# Patient Record
Sex: MALE | Race: BLACK OR AFRICAN AMERICAN | NOT HISPANIC OR LATINO | URBAN - METROPOLITAN AREA
[De-identification: names, ages, dates, MRNs, and addresses within clinical notes are randomized per-mention and may not be internally consistent; named-entity substitution may affect disease eponyms.]

---

## 2023-08-23 ENCOUNTER — OFFICE VISIT (OUTPATIENT)
Dept: OPHTHALMOLOGY | Facility: CLINIC | Age: 58
End: 2023-08-23
Payer: MEDICAID

## 2023-08-23 ENCOUNTER — CLINICAL SUPPORT (OUTPATIENT)
Dept: OPHTHALMOLOGY | Facility: CLINIC | Age: 58
End: 2023-08-23
Payer: MEDICAID

## 2023-08-23 DIAGNOSIS — H40.1133 PRIMARY OPEN ANGLE GLAUCOMA (POAG) OF BOTH EYES, SEVERE STAGE: ICD-10-CM

## 2023-08-23 PROCEDURE — 92020 GONIOSCOPY: CPT | Mod: PBBFAC | Performed by: STUDENT IN AN ORGANIZED HEALTH CARE EDUCATION/TRAINING PROGRAM

## 2023-08-23 PROCEDURE — 1159F MED LIST DOCD IN RCRD: CPT | Mod: CPTII,,, | Performed by: STUDENT IN AN ORGANIZED HEALTH CARE EDUCATION/TRAINING PROGRAM

## 2023-08-23 PROCEDURE — 99999 PR PBB SHADOW E&M-EST. PATIENT-LVL II: CPT | Mod: PBBFAC,,, | Performed by: STUDENT IN AN ORGANIZED HEALTH CARE EDUCATION/TRAINING PROGRAM

## 2023-08-23 PROCEDURE — 92083 HUMPHREY VISUAL FIELD - OU - BOTH EYES: ICD-10-PCS | Mod: 26,S$PBB,, | Performed by: STUDENT IN AN ORGANIZED HEALTH CARE EDUCATION/TRAINING PROGRAM

## 2023-08-23 PROCEDURE — 1159F PR MEDICATION LIST DOCUMENTED IN MEDICAL RECORD: ICD-10-PCS | Mod: CPTII,,, | Performed by: STUDENT IN AN ORGANIZED HEALTH CARE EDUCATION/TRAINING PROGRAM

## 2023-08-23 PROCEDURE — 99212 OFFICE O/P EST SF 10 MIN: CPT | Mod: PBBFAC | Performed by: STUDENT IN AN ORGANIZED HEALTH CARE EDUCATION/TRAINING PROGRAM

## 2023-08-23 PROCEDURE — 99999 PR PBB SHADOW E&M-EST. PATIENT-LVL II: ICD-10-PCS | Mod: PBBFAC,,, | Performed by: STUDENT IN AN ORGANIZED HEALTH CARE EDUCATION/TRAINING PROGRAM

## 2023-08-23 PROCEDURE — 92133 CPTRZD OPH DX IMG PST SGM ON: CPT | Mod: PBBFAC | Performed by: STUDENT IN AN ORGANIZED HEALTH CARE EDUCATION/TRAINING PROGRAM

## 2023-08-23 PROCEDURE — 92133 POSTERIOR SEGMENT OCT OPTIC NERVE(OCULAR COHERENCE TOMOGRAPHY) - OU - BOTH EYES: ICD-10-PCS | Mod: 26,S$PBB,, | Performed by: STUDENT IN AN ORGANIZED HEALTH CARE EDUCATION/TRAINING PROGRAM

## 2023-08-23 PROCEDURE — 92020 PR SPECIAL EYE EVAL,GONIOSCOPY: ICD-10-PCS | Mod: S$PBB,,, | Performed by: STUDENT IN AN ORGANIZED HEALTH CARE EDUCATION/TRAINING PROGRAM

## 2023-08-23 PROCEDURE — 99204 OFFICE O/P NEW MOD 45 MIN: CPT | Mod: S$PBB,,, | Performed by: STUDENT IN AN ORGANIZED HEALTH CARE EDUCATION/TRAINING PROGRAM

## 2023-08-23 PROCEDURE — 76514 ULTRASOUND PACHYMETRY: ICD-10-PCS | Mod: 26,S$PBB,, | Performed by: STUDENT IN AN ORGANIZED HEALTH CARE EDUCATION/TRAINING PROGRAM

## 2023-08-23 PROCEDURE — 99204 PR OFFICE/OUTPT VISIT, NEW, LEVL IV, 45-59 MIN: ICD-10-PCS | Mod: S$PBB,,, | Performed by: STUDENT IN AN ORGANIZED HEALTH CARE EDUCATION/TRAINING PROGRAM

## 2023-08-23 PROCEDURE — 92083 EXTENDED VISUAL FIELD XM: CPT | Mod: PBBFAC | Performed by: STUDENT IN AN ORGANIZED HEALTH CARE EDUCATION/TRAINING PROGRAM

## 2023-08-23 PROCEDURE — 76514 ECHO EXAM OF EYE THICKNESS: CPT | Mod: PBBFAC | Performed by: STUDENT IN AN ORGANIZED HEALTH CARE EDUCATION/TRAINING PROGRAM

## 2023-08-23 PROCEDURE — 92020 GONIOSCOPY: CPT | Mod: S$PBB,,, | Performed by: STUDENT IN AN ORGANIZED HEALTH CARE EDUCATION/TRAINING PROGRAM

## 2023-08-23 RX ORDER — DORZOLAMIDE HYDROCHLORIDE AND TIMOLOL MALEATE 20; 5 MG/ML; MG/ML
1 SOLUTION/ DROPS OPHTHALMIC 2 TIMES DAILY
Qty: 10 ML | Refills: 11 | Status: SHIPPED | OUTPATIENT
Start: 2023-08-23 | End: 2023-12-05 | Stop reason: SDUPTHER

## 2023-08-23 RX ORDER — LATANOPROST 50 UG/ML
1 SOLUTION/ DROPS OPHTHALMIC DAILY
Qty: 2.5 ML | Refills: 11 | Status: SHIPPED | OUTPATIENT
Start: 2023-08-23 | End: 2023-12-05 | Stop reason: SDUPTHER

## 2023-08-23 NOTE — PROGRESS NOTES
Subjective:  HPI     Glaucoma     Additional comments: New patient glaucoma evaluation per Dr. Alisha Ramirez ([facility]).           Comments    Chief complaint: New patient glaucoma evaluation per Dr. Alisha Ramirez   ([facility]). Pt states that he had a routine eye exam   in June 2023 and was informed that he has elevate IOP OU. Pt states that   he was not given any eye medication to help treat. Pt denies any eye pain   or headache.    Past medical history? HTN  Past ocular history? (+)elevated IOP OU    Glaucoma history:  Diagnosed with glaucoma when? 2 months ago  Hx eye surgery? None  Hx eye lasers? None  History of low blood pressure? Yes  History of migraines? Yes  History of blunt trauma to eye? (+)pt hit in eye a few years ago while   playing basketball. Pt does not remember which eye.  History of steroid use? None  Family history of glaucoma? None per pt history. Pt's sister has hx of   cataracts.  What is the highest your eye pressure has been? Unknown    Eye drops? None            Last edited by Adelina Ngo on 8/23/2023 10:11 AM.        Exam:  Base Eye Exam       Visual Acuity (Snellen - Linear)         Right Left    Dist sc 20/20 20/60 +1    Dist ph sc  20/50 +1              Tonometry (Applanation, 09:45 AM)         Right Left    Pressure 32 34              Pachymetry (8/23/2023)         Right Left    Thickness 558 556              Gonioscopy (Wojciech)         Right Left    Temporal SS SS    Nasal SS SS    Superior SS SS    Inferior SS SS              Pupils         Dark Light Shape React APD    Right 4 3 Round Brisk None    Left 4 3 Round Slow +2              Visual Fields         Right Left    Restrictions Partial outer superior temporal, inferior temporal, superior nasal, inferior nasal deficiencies Partial outer superior temporal, inferior temporal, superior nasal, inferior nasal deficiencies              Neuro/Psych       Oriented x3: Yes    Mood/Affect:  Normal                  Slit Lamp and Fundus Exam       External Exam         Right Left    External Normal Normal              Slit Lamp Exam         Right Left    Lids/Lashes Normal Normal    Conjunctiva/Sclera Pigmentation, Injection Pigmentation, Injection    Cornea Arcus Arcus    Anterior Chamber Deep and quiet Deep and quiet    Iris Round and reactive Round and reactive    Lens 1+ Nuclear sclerosis 1+ Nuclear sclerosis              Fundus Exam         Right Left    Disc Cupping, thin sup/inf Cupping, thin sup/inf    C/D Ratio 0.9 0.95                  Refraction       Manifest Refraction (Auto)         Sphere Cylinder Axis    Right [place] +0.25 079    Left +0.50 +1.75 001                  Assessment:  Presumed juvenile open angle glaucoma, severe stage, OD<OS  - Synopsis: Referred by Dr. Alisha Ramirez for elevated IOP OU.   - H/o blunt eye trauma: no black eye but hit with hand during basketball game  - Surg hx: none   - Laser hx: none  - Glaucoma FHx: denies  -  / 556  - Gonio:  (Coulon 8/2023)  - Tmax: 32/34  - Target IOP: <12 OU (severity)  - Med adverse effects: n/a  - RAPD OS on presentation 8/2023    Baseline HVF 8/2023  Baseline RNFL 8/2023 -- avg 43/50  Baseline photos pending    08/23/2023  IOP 32/34 above target off drops  HVF: reliable, SAD/IAD, central island, VFI 20 OD  reliable, SAD/IAD, central island, VFI 9 OS -- baseline  OCT RNFL: diffuse thinning, avg 43 OD  diffuse thinning, avg 50 OS -- baseline, at floor OU  +RAPD OS    Cataracts OU  - Mild, NVS, monitor    Plan:  Severe JOAG OU. Risk of irreversible blindness from glaucoma discussed. Optimize IOP.  - Start Cosopt 2/2, Xalatan 1/1    Return 2 weeks -- VA, IOP, Dilate, Optos/Stereo photos on way out    Griselda Silvestre MD  [facility], Glaucoma

## 2023-08-23 NOTE — PATIENT INSTRUCTIONS
Today we discussed the following diagnosis:  Juvenile open angle glaucoma, severe stage, right<left eye    INSTRUCTIONS  Please use your eyedrops as follows:  - The color refers to the bottle top.  - Some generic medications come in bottle with white tops. If you have any questions about your drops, please ask your doctor.    Drug Eye Top Color Breakfast Lunch Dinner Bedtime   Cosopt:  Dorzolamide/  Timolol BOTH Blue X  X    Xalatan:  Latanoprost BOTH Teal    X     - Always wait at least 5 minutes between drops to allow them to work.  - It is very important to take your eye drops every day as instructed, including the day of your next visit.  - Please remember to bring your eye drops to your next visit.  - If you run out of any of your drops before your next visit, please have the pharmacy call your doctor to authorize a refill.    Griselda Silvestre MD  Office number: [phone]

## 2023-09-06 ENCOUNTER — OFFICE VISIT (OUTPATIENT)
Dept: OPHTHALMOLOGY | Facility: CLINIC | Age: 58
End: 2023-09-06
Payer: MEDICAID

## 2023-09-06 DIAGNOSIS — H40.1133 PRIMARY OPEN ANGLE GLAUCOMA (POAG) OF BOTH EYES, SEVERE STAGE: Primary | ICD-10-CM

## 2023-09-06 PROCEDURE — 99214 OFFICE O/P EST MOD 30 MIN: CPT | Mod: S$PBB,,, | Performed by: STUDENT IN AN ORGANIZED HEALTH CARE EDUCATION/TRAINING PROGRAM

## 2023-09-06 PROCEDURE — 99999 PR PBB SHADOW E&M-EST. PATIENT-LVL II: ICD-10-PCS | Mod: PBBFAC,,, | Performed by: STUDENT IN AN ORGANIZED HEALTH CARE EDUCATION/TRAINING PROGRAM

## 2023-09-06 PROCEDURE — 92250 COLOR FUNDUS PHOTOGRAPHY - OU - BOTH EYES: ICD-10-PCS | Mod: 26,S$PBB,, | Performed by: STUDENT IN AN ORGANIZED HEALTH CARE EDUCATION/TRAINING PROGRAM

## 2023-09-06 PROCEDURE — 92015 DETERMINE REFRACTIVE STATE: CPT | Mod: ,,, | Performed by: STUDENT IN AN ORGANIZED HEALTH CARE EDUCATION/TRAINING PROGRAM

## 2023-09-06 PROCEDURE — 99999 PR PBB SHADOW E&M-EST. PATIENT-LVL II: CPT | Mod: PBBFAC,,, | Performed by: STUDENT IN AN ORGANIZED HEALTH CARE EDUCATION/TRAINING PROGRAM

## 2023-09-06 PROCEDURE — 99214 PR OFFICE/OUTPT VISIT, EST, LEVL IV, 30-39 MIN: ICD-10-PCS | Mod: S$PBB,,, | Performed by: STUDENT IN AN ORGANIZED HEALTH CARE EDUCATION/TRAINING PROGRAM

## 2023-09-06 PROCEDURE — 92015 PR REFRACTION: ICD-10-PCS | Mod: ,,, | Performed by: STUDENT IN AN ORGANIZED HEALTH CARE EDUCATION/TRAINING PROGRAM

## 2023-09-06 PROCEDURE — 1159F MED LIST DOCD IN RCRD: CPT | Mod: CPTII,,, | Performed by: STUDENT IN AN ORGANIZED HEALTH CARE EDUCATION/TRAINING PROGRAM

## 2023-09-06 PROCEDURE — 1159F PR MEDICATION LIST DOCUMENTED IN MEDICAL RECORD: ICD-10-PCS | Mod: CPTII,,, | Performed by: STUDENT IN AN ORGANIZED HEALTH CARE EDUCATION/TRAINING PROGRAM

## 2023-09-06 PROCEDURE — 92250 FUNDUS PHOTOGRAPHY W/I&R: CPT | Mod: PBBFAC | Performed by: STUDENT IN AN ORGANIZED HEALTH CARE EDUCATION/TRAINING PROGRAM

## 2023-09-06 PROCEDURE — 99212 OFFICE O/P EST SF 10 MIN: CPT | Mod: PBBFAC | Performed by: STUDENT IN AN ORGANIZED HEALTH CARE EDUCATION/TRAINING PROGRAM

## 2023-09-06 NOTE — PROGRESS NOTES
Subjective:  HPI     Glaucoma     Additional comments: 2 week IOP check + DFE with Optos/ONH photos.  (+)RAPD OS           Comments    Pt here for 2 week IOP check + DFE with Optos/ONH photos. Pt states that   he had some cloudy VA recently during rainy weather while driving. Pt   states that it takes a while to focus in between dark and bright lighting.   Pt denies any eye pain. Pt states that he has been using all drops as   instructed.    DLS: 08/23/2023    Gtts:  1. Cosopt BID OU  2. Latanoprost qhs OU    POHx:  1. Presumed juvenile open angle glaucoma, severe stage, OD<OS      - Target IOP: <12 OU (severity)      - RAPD OS on presentation 8/2023  2. Cataracts OU          Last edited by Adelina Ngo on 9/6/2023  8:11 AM.        Exam:  Base Eye Exam       Visual Acuity (Snellen - Linear)         Right Left    Dist cc 20/50 +2 20/40    Dist ph cc 20/20 -3 20/40 +2      Correction: Glasses              Tonometry (Applanation, 8:25 AM)         Right Left    Pressure 17 16              Pupils         Dark Light Shape React APD    Right 4 3 Round Brisk None    Left 4 3 Round Brisk APD              Neuro/Psych       Oriented x3: Yes    Mood/Affect: Normal              Dilation       Both eyes: 1% Mydriacyl, 2.5% Phenylephrine @ 8:25 AM                  Slit Lamp and Fundus Exam       External Exam         Right Left    External Normal Normal              Slit Lamp Exam         Right Left    Lids/Lashes Normal Normal    Conjunctiva/Sclera Pigmentation, Injection Pigmentation, Injection    Cornea Arcus Arcus    Anterior Chamber Deep and quiet Deep and quiet    Iris Round and reactive Round and reactive    Lens 1+ Nuclear sclerosis, 1+ Cortical cataract 1+ Nuclear sclerosis, 1+ Cortical cataract    Anterior Vitreous Normal Normal              Fundus Exam         Right Left    Disc Cupping, thin sup/inf Cupping, thin sup/inf    C/D Ratio 0.9 0.95    Macula Normal Normal    Vessels Normal Normal    Periphery Superior CRS,  otherwise flat and normal 260 Normal                  Refraction       Wearing Rx         Sphere Cylinder Add    Right +0.50 Sphere +2.00    Left +0.50 Sphere +2.00      Type: Lined Bifocal              Manifest Refraction         Sphere Cylinder Dist [place]    Right [place] Sphere 20/20    Left -0.50 Sphere 20/40              Final Rx         Sphere Cylinder Axis Add    Right [place] +0.25 079 +2.00    Left +0.50 +1.75 001 +2.00      Type: Lined Bifocal    Expiration Date: 09/06/2024                  Assessment:  Presumed juvenile open angle glaucoma, severe stage, OD<OS  - Synopsis: Referred by Dr. Alisha Ramirez for elevated IOP OU.   - H/o blunt eye trauma: no black eye but hit with hand during basketball game  - Surg hx: none   - Laser hx: none  - Glaucoma FHx: denies  -  / 556  - Gonio:  (Konrad 8/2023)  - Tmax: 32/34  - Target IOP: <12 OU (severity)  - Med adverse effects: n/a  - RAPD OS on presentation 8/2023  - Med hx: Cosopt/Xalatan 8/2023    Baseline HVF 8/2023  Baseline RNFL 8/2023 -- avg 43/50  Baseline photos 9/2023    Last imaging:  HVF: reliable, SAD/IAD, central island, VFI 20 OD  reliable, SAD/IAD, central island, VFI 9 OS -- baseline  OCT RNFL: diffuse thinning, avg 43 OD  diffuse thinning, avg 50 OS -- baseline, at floor OU  +RAPD OS    09/06/2023  IOP 17/16 on Cosopt 2/2, Xalatan 1/1  Nice improvement in IOP with start of drops    Cataracts OU  - Mild, NVS, monitor  - MRX provided today    Plan:  Severe JOAG OU. Risk of irreversible blindness from glaucoma discussed. Optimize IOP.    Good IOP response with addition of drops. Daily compliance stressed.  - Continue Cosopt 2/2, Xalatan 1/1  - MRx dispensed    Return 3 months -- HVF 10-2 OU, OCT, VA, IOP  Next DFE due 9/2024    Griselda Silvestre MD  [facility], Glaucoma

## 2023-12-05 ENCOUNTER — CLINICAL SUPPORT (OUTPATIENT)
Dept: OPHTHALMOLOGY | Facility: CLINIC | Age: 58
End: 2023-12-05
Payer: MEDICAID

## 2023-12-05 ENCOUNTER — OFFICE VISIT (OUTPATIENT)
Dept: OPHTHALMOLOGY | Facility: CLINIC | Age: 58
End: 2023-12-05
Payer: MEDICAID

## 2023-12-05 DIAGNOSIS — I10 PRIMARY HYPERTENSION: ICD-10-CM

## 2023-12-05 DIAGNOSIS — H40.1133 PRIMARY OPEN ANGLE GLAUCOMA (POAG) OF BOTH EYES, SEVERE STAGE: Primary | ICD-10-CM

## 2023-12-05 DIAGNOSIS — H25.813 COMBINED FORMS OF AGE-RELATED CATARACT OF BOTH EYES: ICD-10-CM

## 2023-12-05 PROCEDURE — 99999 PR PBB SHADOW E&M-EST. PATIENT-LVL I: CPT | Mod: PBBFAC,,, | Performed by: STUDENT IN AN ORGANIZED HEALTH CARE EDUCATION/TRAINING PROGRAM

## 2023-12-05 PROCEDURE — 99999 PR PBB SHADOW E&M-EST. PATIENT-LVL I: ICD-10-PCS | Mod: PBBFAC,,, | Performed by: STUDENT IN AN ORGANIZED HEALTH CARE EDUCATION/TRAINING PROGRAM

## 2023-12-05 PROCEDURE — 99214 PR OFFICE/OUTPT VISIT, EST, LEVL IV, 30-39 MIN: ICD-10-PCS | Mod: S$PBB,,, | Performed by: STUDENT IN AN ORGANIZED HEALTH CARE EDUCATION/TRAINING PROGRAM

## 2023-12-05 PROCEDURE — 99214 OFFICE O/P EST MOD 30 MIN: CPT | Mod: S$PBB,,, | Performed by: STUDENT IN AN ORGANIZED HEALTH CARE EDUCATION/TRAINING PROGRAM

## 2023-12-05 PROCEDURE — 1159F MED LIST DOCD IN RCRD: CPT | Mod: CPTII,,, | Performed by: STUDENT IN AN ORGANIZED HEALTH CARE EDUCATION/TRAINING PROGRAM

## 2023-12-05 PROCEDURE — 99211 OFF/OP EST MAY X REQ PHY/QHP: CPT | Mod: PBBFAC | Performed by: STUDENT IN AN ORGANIZED HEALTH CARE EDUCATION/TRAINING PROGRAM

## 2023-12-05 PROCEDURE — 92133 CPTRZD OPH DX IMG PST SGM ON: CPT | Mod: PBBFAC | Performed by: STUDENT IN AN ORGANIZED HEALTH CARE EDUCATION/TRAINING PROGRAM

## 2023-12-05 PROCEDURE — 92083 EXTENDED VISUAL FIELD XM: CPT | Mod: PBBFAC | Performed by: STUDENT IN AN ORGANIZED HEALTH CARE EDUCATION/TRAINING PROGRAM

## 2023-12-05 PROCEDURE — 92083 HUMPHREY VISUAL FIELD - OU - BOTH EYES: ICD-10-PCS | Mod: 26,S$PBB,, | Performed by: STUDENT IN AN ORGANIZED HEALTH CARE EDUCATION/TRAINING PROGRAM

## 2023-12-05 PROCEDURE — 92133 POSTERIOR SEGMENT OCT OPTIC NERVE(OCULAR COHERENCE TOMOGRAPHY) - OU - BOTH EYES: ICD-10-PCS | Mod: 26,S$PBB,, | Performed by: STUDENT IN AN ORGANIZED HEALTH CARE EDUCATION/TRAINING PROGRAM

## 2023-12-05 PROCEDURE — 1159F PR MEDICATION LIST DOCUMENTED IN MEDICAL RECORD: ICD-10-PCS | Mod: CPTII,,, | Performed by: STUDENT IN AN ORGANIZED HEALTH CARE EDUCATION/TRAINING PROGRAM

## 2023-12-05 RX ORDER — LATANOPROST 50 UG/ML
1 SOLUTION/ DROPS OPHTHALMIC DAILY
Qty: 2.5 ML | Refills: 11 | Status: SHIPPED | OUTPATIENT
Start: 2023-12-05 | End: 2024-12-04

## 2023-12-05 RX ORDER — DORZOLAMIDE HYDROCHLORIDE AND TIMOLOL MALEATE 20; 5 MG/ML; MG/ML
1 SOLUTION/ DROPS OPHTHALMIC 2 TIMES DAILY
Qty: 10 ML | Refills: 11 | Status: SHIPPED | OUTPATIENT
Start: 2023-12-05 | End: 2024-12-04

## 2023-12-05 NOTE — PROGRESS NOTES
Assessment:  Presumed juvenile open angle glaucoma, severe stage, OD<OS  - Synopsis: Referred by Dr. Alisha Ramirez for elevated IOP OU.   - H/o blunt eye trauma: no black eye but hit with hand during basketball game  - Surg hx: none   - Laser hx: none  - Glaucoma FHx: denies  -  / 556  - Gonio:  (Coulon 8/2023)  - Tmax: 32/34  - Target IOP: <12 OU (severity)  - Med adverse effects: n/a  - RAPD OS on presentation 8/2023  - Med hx: Cosopt/Xalatan 8/2023    Baseline HVF 24-2 8/2023 -- VFI 20/9  Baseline HVF 10-2 12/2023 -- MD -15.62/-23.55  Baseline RNFL 8/2023 -- avg 43/50  Baseline photos 9/2023    Last imaging:  HVF: reliable, SAD/IAD, central island, VFI 20 OD  reliable, SAD/IAD, central island, VFI 9 OS -- baseline  OCT RNFL: diffuse thinning, avg 43 OD  diffuse thinning, avg 50 OS -- baseline, at floor OU    12/05/2023  IOP 12/12 on Cosopt 2/2, Xalatan 1/1  HVF 10-2: reliable, SAD, MD -15.62, PSD 12.59 OD  reliable, SAD>IAD, MD -23.55, PSD 10.79 -- baseline  OCT: diffuse thinning, avg 45 OD  diffuse thinning, avg 50 OS -- likely stable c/w prior but at floor OU    Cataracts OU  - Mild, NVS, monitor    Plan:  IOP at goal. Baseline HVF 10-2 today, severe OD<OS.   - Continue Cosopt 2/2, Xalatan 1/1    Return 4 months -- HVF 10-2 OU, VA, IOP  Next DFE due 9/2024    Griselda Silvestre MD  [facility], Glaucoma

## 2023-12-05 NOTE — PROGRESS NOTES
HVF rel/fix coop good OU/chart checked for latex allergy/0 + 0.75 x 079 OD .50 + 1.75 x 001 OS - BJ

## 2024-03-11 ENCOUNTER — OFFICE VISIT (OUTPATIENT)
Dept: PRIMARY CARE CLINIC | Facility: CLINIC | Age: 59
End: 2024-03-11
Payer: MEDICAID

## 2024-03-11 VITALS
DIASTOLIC BLOOD PRESSURE: 89 MMHG | WEIGHT: 169.88 LBS | OXYGEN SATURATION: 99 % | SYSTOLIC BLOOD PRESSURE: 139 MMHG | HEART RATE: 65 BPM

## 2024-03-11 DIAGNOSIS — Z12.5 PROSTATE CANCER SCREENING: ICD-10-CM

## 2024-03-11 DIAGNOSIS — F11.20 HEROIN ADDICTION: ICD-10-CM

## 2024-03-11 DIAGNOSIS — B35.3 TINEA PEDIS OF BOTH FEET: ICD-10-CM

## 2024-03-11 DIAGNOSIS — I10 PRIMARY HYPERTENSION: ICD-10-CM

## 2024-03-11 DIAGNOSIS — Z12.11 COLON CANCER SCREENING: Primary | ICD-10-CM

## 2024-03-11 DIAGNOSIS — R22.9 LUMP OF SKIN: ICD-10-CM

## 2024-03-11 DIAGNOSIS — Z13.1 DIABETES MELLITUS SCREENING: ICD-10-CM

## 2024-03-11 DIAGNOSIS — F19.90 SUBSTANCE USE DISORDER: ICD-10-CM

## 2024-03-11 DIAGNOSIS — Z86.79 HISTORY OF HYPERTENSION: ICD-10-CM

## 2024-03-11 DIAGNOSIS — Z11.59 ENCOUNTER FOR HEPATITIS C SCREENING TEST FOR LOW RISK PATIENT: ICD-10-CM

## 2024-03-11 DIAGNOSIS — Z11.4 SCREENING FOR HIV (HUMAN IMMUNODEFICIENCY VIRUS): ICD-10-CM

## 2024-03-11 PROCEDURE — 3075F SYST BP GE 130 - 139MM HG: CPT | Mod: CPTII,,, | Performed by: STUDENT IN AN ORGANIZED HEALTH CARE EDUCATION/TRAINING PROGRAM

## 2024-03-11 PROCEDURE — 99999 PR PBB SHADOW E&M-EST. PATIENT-LVL IV: CPT | Mod: PBBFAC,,, | Performed by: STUDENT IN AN ORGANIZED HEALTH CARE EDUCATION/TRAINING PROGRAM

## 2024-03-11 PROCEDURE — 99204 OFFICE O/P NEW MOD 45 MIN: CPT | Mod: S$PBB,,, | Performed by: STUDENT IN AN ORGANIZED HEALTH CARE EDUCATION/TRAINING PROGRAM

## 2024-03-11 PROCEDURE — 3079F DIAST BP 80-89 MM HG: CPT | Mod: CPTII,,, | Performed by: STUDENT IN AN ORGANIZED HEALTH CARE EDUCATION/TRAINING PROGRAM

## 2024-03-11 PROCEDURE — 4010F ACE/ARB THERAPY RXD/TAKEN: CPT | Mod: CPTII,,, | Performed by: STUDENT IN AN ORGANIZED HEALTH CARE EDUCATION/TRAINING PROGRAM

## 2024-03-11 PROCEDURE — 99214 OFFICE O/P EST MOD 30 MIN: CPT | Mod: PBBFAC,PN | Performed by: STUDENT IN AN ORGANIZED HEALTH CARE EDUCATION/TRAINING PROGRAM

## 2024-03-11 RX ORDER — LOSARTAN POTASSIUM 50 MG/1
1 TABLET ORAL DAILY
COMMUNITY
Start: 2023-05-30 | End: 2024-05-29

## 2024-03-11 NOTE — PROGRESS NOTES
03/12/2024    Gatito Dao  [id number]    Chief Complaint   Patient presents with    [facility]    Hypertension       HPI    This patient is new to me and presents to est care. PMH sig for HTN and glaucoma. Hx of incarceration, patient has been out for 11 months and needs a pcp. Describes diet as regular [place]. Exercise: none. Sleep: no concerns     Hx of HTN  Has not taken since getting out  Hx of losartan    Glaucoma: follows with ophthalmology    Athlete's foot: states that it's really bad and would like referral     Lump on right side: present for a long time and would like it worked up    Substance use: pt endorses heroin use intermittently for the last 30 years. Most recent use this morning. Patient snorts.       Negative 10 point ROS outside of HPI    Social History     Socioeconomic History    Marital status: Other         Current Outpatient Medications:     losartan (COZAAR) 50 MG tablet, Take 1 tablet by mouth once daily., Disp: , Rfl:     dorzolamide-timolol 2-0.5% (COSOPT) 22.3-6.8 mg/mL ophthalmic solution, Place 1 drop into both eyes 2 (two) times daily., Disp: 10 mL, Rfl: 11    latanoprost (XALATAN) 0.005 % ophthalmic solution, Place 1 drop into both eyes once daily., Disp: 2.5 mL, Rfl: 11      Physical Exam  Vitals:    03/11/24 1411   BP: 139/89   Pulse: 65       Physical Exam  Abdominal:          Comments: Highlighted area w/soft non mobile mass, nontender        Gen: well appearing, NAD  Resp: non labored breathing, no crackles, no wheezes, CTAB  CV: RRR no murmur, gallops, rubs, no LE edema  Abd: soft nontender BS present no organomegaly      2. Colon cancer screening  - Ambulatory referral/consult to Endo Procedure ; Future    3. Prostate cancer screening  Last PSA wnl  - PSA, SCREENING; Future    4. Screening for HIV (human immunodeficiency virus)  - HIV 1/2 Ag/Ab (4th Gen); Future    5. Encounter for hepatitis C screening test for low risk patient  - HEPATITIS C ANTIBODY;  Future    6. History of hypertension  Recommended daily checking for the next 2 weeks   - Lipid Panel; Future  - CBC Auto Differential; Future  - Comprehensive Metabolic Panel; Future    7. Diabetes mellitus screening  - Hemoglobin A1C; Future    8. Tinea pedis of both feet  Recommended keeping feet dry  - Ambulatory referral/consult to Podiatry; Future    9. Lump of skin  Feels like a lipoma  - US Abdomen Limited; Future    10. Substance use disorder  Heroin; last use this AM  - Ambulatory referral/consult to Psychology; Future      RTC in 6 months for routine care    Cleopatra Faria MD  Family Medicine

## 2024-03-12 ENCOUNTER — PATIENT OUTREACH (OUTPATIENT)
Dept: ADMINISTRATIVE | Facility: OTHER | Age: 59
End: 2024-03-12
Payer: MEDICAID

## 2024-03-12 PROBLEM — I10 PRIMARY HYPERTENSION: Status: ACTIVE | Noted: 2024-03-12

## 2024-03-12 PROBLEM — F11.20 HEROIN ADDICTION: Status: ACTIVE | Noted: 2024-03-12

## 2024-03-12 NOTE — PROGRESS NOTES
CHW - Initial Contact    This Community Health Worker completed the Social Determinant of Health questionnaire with MRN [id number] over the phone today.    Pt identified barriers of most importance are: No barriers reported   Referrals to community agencies completed with patient/caregiver consent outside of [facility] include: No   Referrals were put through [facility] - No   Support and Services: No support & services have been documented.  Other information discussed the patient needs / wants help with: SDOH completed. No assistance requested at this time.   Follow up required: No   No future outreach task assigned

## 2024-03-13 ENCOUNTER — TELEPHONE (OUTPATIENT)
Dept: GASTROENTEROLOGY | Facility: CLINIC | Age: 59
End: 2024-03-13
Payer: MEDICAID

## 2024-03-14 ENCOUNTER — HOSPITAL ENCOUNTER (OUTPATIENT)
Dept: RADIOLOGY | Facility: OTHER | Age: 59
Discharge: HOME | End: 2024-03-14
Attending: STUDENT IN AN ORGANIZED HEALTH CARE EDUCATION/TRAINING PROGRAM
Payer: MEDICAID

## 2024-03-14 DIAGNOSIS — R22.9 LUMP OF SKIN: ICD-10-CM

## 2024-03-14 PROCEDURE — 76705 ECHO EXAM OF ABDOMEN: CPT | Mod: TC

## 2024-03-14 PROCEDURE — 76705 ECHO EXAM OF ABDOMEN: CPT | Mod: 26,,, | Performed by: RADIOLOGY

## 2024-03-15 ENCOUNTER — TELEPHONE (OUTPATIENT)
Dept: PRIMARY CARE CLINIC | Facility: CLINIC | Age: 59
End: 2024-03-15
Payer: MEDICAID

## 2024-03-15 NOTE — TELEPHONE ENCOUNTER
----- Message from Cleopatra Faria MD sent at 3/15/2024  8:17 AM CDT -----  Please let patient know that his blood work was okay there were a couple of values that were just mildly outside of normal range and so I would like to repeat his labs in 3- 6 months.

## 2024-03-15 NOTE — TELEPHONE ENCOUNTER
----- Message from Cleopatra Faria MD sent at 3/15/2024  8:16 AM CDT -----  Please let patient know that his area of concern is likely a lipoma, which is a benign fatty tumor.  If he would like to removed I can place a referral to General surgery.  Otherwise it will cause no harm hard.

## 2024-04-02 ENCOUNTER — OFFICE VISIT (OUTPATIENT)
Dept: OPHTHALMOLOGY | Facility: CLINIC | Age: 59
End: 2024-04-02
Payer: MEDICAID

## 2024-04-02 ENCOUNTER — CLINICAL SUPPORT (OUTPATIENT)
Dept: OPHTHALMOLOGY | Facility: CLINIC | Age: 59
End: 2024-04-02
Payer: MEDICAID

## 2024-04-02 DIAGNOSIS — H40.1133 PRIMARY OPEN ANGLE GLAUCOMA (POAG) OF BOTH EYES, SEVERE STAGE: Primary | ICD-10-CM

## 2024-04-02 DIAGNOSIS — H25.813 COMBINED FORMS OF AGE-RELATED CATARACT OF BOTH EYES: ICD-10-CM

## 2024-04-02 PROCEDURE — 99999 PR PBB SHADOW E&M-EST. PATIENT-LVL I: CPT | Mod: PBBFAC,,, | Performed by: STUDENT IN AN ORGANIZED HEALTH CARE EDUCATION/TRAINING PROGRAM

## 2024-04-02 PROCEDURE — 99211 OFF/OP EST MAY X REQ PHY/QHP: CPT | Mod: PBBFAC | Performed by: STUDENT IN AN ORGANIZED HEALTH CARE EDUCATION/TRAINING PROGRAM

## 2024-04-02 PROCEDURE — 92083 EXTENDED VISUAL FIELD XM: CPT | Mod: PBBFAC | Performed by: STUDENT IN AN ORGANIZED HEALTH CARE EDUCATION/TRAINING PROGRAM

## 2024-04-02 PROCEDURE — G2211 COMPLEX E/M VISIT ADD ON: HCPCS | Mod: S$PBB,,, | Performed by: STUDENT IN AN ORGANIZED HEALTH CARE EDUCATION/TRAINING PROGRAM

## 2024-04-02 PROCEDURE — 99214 OFFICE O/P EST MOD 30 MIN: CPT | Mod: S$PBB,,, | Performed by: STUDENT IN AN ORGANIZED HEALTH CARE EDUCATION/TRAINING PROGRAM

## 2024-04-02 PROCEDURE — 3044F HG A1C LEVEL LT 7.0%: CPT | Mod: CPTII,,, | Performed by: STUDENT IN AN ORGANIZED HEALTH CARE EDUCATION/TRAINING PROGRAM

## 2024-04-02 PROCEDURE — 4010F ACE/ARB THERAPY RXD/TAKEN: CPT | Mod: CPTII,,, | Performed by: STUDENT IN AN ORGANIZED HEALTH CARE EDUCATION/TRAINING PROGRAM

## 2024-04-02 RX ORDER — BRIMONIDINE TARTRATE 2 MG/ML
1 SOLUTION/ DROPS OPHTHALMIC 2 TIMES DAILY
Qty: 15 ML | Refills: 11 | Status: SHIPPED | OUTPATIENT
Start: 2024-04-02 | End: 2025-04-02

## 2024-04-02 RX ORDER — LATANOPROST 50 UG/ML
1 SOLUTION/ DROPS OPHTHALMIC DAILY
Qty: 2.5 ML | Refills: 11 | Status: SHIPPED | OUTPATIENT
Start: 2024-04-02 | End: 2025-04-02

## 2024-04-02 NOTE — PROGRESS NOTES
HVF rel/fix coop good OU/chart checked for latex allergy/0 + 0.25 x 079 OD 0.50 + 1.75 x 001 OS - BJ

## 2024-04-02 NOTE — PATIENT INSTRUCTIONS
INSTRUCTIONS  Please use your eyedrops as follows:  - The color refers to the bottle top.  - Some generic medications come in bottle with white tops. If you have any questions about your drops, please ask your doctor.    Drug Eye Top Color Breakfast Lunch Dinner Bedtime   Alphagan:  Brimonidine BOTH Purple X  X    Cosopt:  Dorzolamide/  Timolol BOTH Blue X  X    Xalatan:  Latanoprost BOTH Teal    X     - Always wait at least 5 minutes between drops to allow them to work.  - It is very important to take your eye drops every day as instructed, including the day of your next visit.  - Please remember to bring your eye drops to your next visit.  - If you run out of any of your drops before your next visit, please have the pharmacy call your doctor to authorize a refill.    Griselda Silvestre MD  Office number: [phone]    ----    Selective Laser Trabeculoplasty  Selective Laser Trabeculoplasty (SLT) is a laser treatment that is used to lower the pressure of the eye.  Laser energy stimulates the internal drainage tissue of the eye (called the trabecular meshwork) and lowering of the eye pressure. The full effects of the laser treatment may not be apparent until 1-2 months after the laser treatment.    Indications  Patients with open-angle glaucoma who are in need of eye pressure lowering.  This can be in addition to eye drops or sometimes in place of eye drops.    Benefits  When used as initial therapy, SLT can lower the eye pressure by about 30%.  The effect may be reduced in patients who are already on medical treatment with eye drops.  The laser is effective in lowering eye pressure in 75-80% of eyes and the effect of the laser can last up to 1-5 years. Some studies have shown effects lasting up to 3 years in 50% of patients. The procedure is non-invasive with minimal discomfort and is performed in an outpatient clinic.  Treatment of each eye is approximately 5-10 minutes and there are no restrictions of  activities afterward.  The laser treatment can be repeated. Laser treatment does not affect the success rates of other medical or surgical treatments.    Risks  In about 10% of procedures, there is a temporary elevation of eye pressure immediately following the laser treatment that is managed with glaucoma medications and generally goes away within 24 hours.  Some patients also report a mild temporary eye discomfort from the lens used during the procedure that can be treated with over-the-counter artificial tears.    What to expect on the day of SLT  Check into clinic as you typically would for a routine clinic appointment.  A technician will then check your vision and your eye pressure and review any medications and eye drops you might be taking.  They will then review a consent form for the SLT procedure with you.      The doctor will then review your exam findings and confirm that an SLT procedure is still the appropriate treatment.  The doctor will put a drop of anesthetic to the eye(s) that are to have the SLT.  The doctor will then position you in the laser machine and place a special contact lens on the surface of the eye.  The lens is coated with a special jelly to help focus the light properly into the eye.  You will then hear a series of clicks and may see flashes of red light as the laser treats the drainage tissue of the eye.  There is virtually no pain with the procedure.  You will feel the lens slowly rotating on the surface of the eye as the doctor treats various areas within the eye.  The procedure generally takes 5-10 minutes per eye.  At the conclusion of the laser treatment, the lens will be removed from the eye.  Because of the jelly used on the lens, it is normal for the vision to be temporarily blurred.      After the laser treatment, you will be asked to wait in the waiting area and your eye pressure will be checked after 20 minutes. If the pressure has not gone up, you will be released home  after scheduling an appropriate follow up.       There are typically no restrictions in activities following laser treatment.  You may return to your normal daily routine.  While side effects of SLT are rare, should you experience any decrease in vision, worsening pain or worsening redness in the eye following your laser treatment, please contact your eye doctor for further instruction or come to the Emergency Room.

## 2024-04-03 ENCOUNTER — PATIENT MESSAGE (OUTPATIENT)
Dept: ADMINISTRATIVE | Facility: HOSPITAL | Age: 59
End: 2024-04-03
Payer: MEDICAID

## 2024-05-17 ENCOUNTER — TELEPHONE (OUTPATIENT)
Dept: PODIATRY | Facility: CLINIC | Age: 59
End: 2024-05-17
Payer: MEDICAID

## 2024-05-23 ENCOUNTER — CLINICAL SUPPORT (OUTPATIENT)
Dept: ENDOSCOPY | Facility: HOSPITAL | Age: 59
End: 2024-05-23
Attending: STUDENT IN AN ORGANIZED HEALTH CARE EDUCATION/TRAINING PROGRAM
Payer: MEDICAID

## 2024-05-23 ENCOUNTER — OFFICE VISIT (OUTPATIENT)
Dept: OPHTHALMOLOGY | Facility: CLINIC | Age: 59
End: 2024-05-23
Payer: MEDICAID

## 2024-05-23 DIAGNOSIS — H40.1133 PRIMARY OPEN ANGLE GLAUCOMA (POAG) OF BOTH EYES, SEVERE STAGE: Primary | ICD-10-CM

## 2024-05-23 DIAGNOSIS — H25.813 COMBINED FORMS OF AGE-RELATED CATARACT OF BOTH EYES: ICD-10-CM

## 2024-05-23 DIAGNOSIS — Z12.11 COLON CANCER SCREENING: ICD-10-CM

## 2024-05-23 PROCEDURE — 99214 OFFICE O/P EST MOD 30 MIN: CPT | Mod: S$PBB,,, | Performed by: STUDENT IN AN ORGANIZED HEALTH CARE EDUCATION/TRAINING PROGRAM

## 2024-05-23 PROCEDURE — 4010F ACE/ARB THERAPY RXD/TAKEN: CPT | Mod: CPTII,,, | Performed by: STUDENT IN AN ORGANIZED HEALTH CARE EDUCATION/TRAINING PROGRAM

## 2024-05-23 PROCEDURE — 99211 OFF/OP EST MAY X REQ PHY/QHP: CPT | Mod: PBBFAC | Performed by: STUDENT IN AN ORGANIZED HEALTH CARE EDUCATION/TRAINING PROGRAM

## 2024-05-23 PROCEDURE — G2211 COMPLEX E/M VISIT ADD ON: HCPCS | Mod: S$PBB,,, | Performed by: STUDENT IN AN ORGANIZED HEALTH CARE EDUCATION/TRAINING PROGRAM

## 2024-05-23 PROCEDURE — 3044F HG A1C LEVEL LT 7.0%: CPT | Mod: CPTII,,, | Performed by: STUDENT IN AN ORGANIZED HEALTH CARE EDUCATION/TRAINING PROGRAM

## 2024-05-23 PROCEDURE — 1159F MED LIST DOCD IN RCRD: CPT | Mod: CPTII,,, | Performed by: STUDENT IN AN ORGANIZED HEALTH CARE EDUCATION/TRAINING PROGRAM

## 2024-05-23 PROCEDURE — 99999 PR PBB SHADOW E&M-EST. PATIENT-LVL I: CPT | Mod: PBBFAC,,, | Performed by: STUDENT IN AN ORGANIZED HEALTH CARE EDUCATION/TRAINING PROGRAM

## 2024-05-23 NOTE — PROGRESS NOTES
Assessment:  Juvenile open angle glaucoma, severe stage, OD<OS  - Synopsis: Referred by Dr. Alisha Ramirez for elevated IOP OU.   - H/o blunt eye trauma: no black eye but hit with hand during basketball game  - Surg hx: none   - Laser hx: none  - Glaucoma FHx: denies  -  / 556  - Gonio:  (Konrad 8/2023)  - Tmax: 32/34  - Target IOP: <12 OU (severity)  - Med adverse effects: n/a  - RAPD OS on presentation 8/2023  - Med hx: Cosopt/Xalatan 8/2023    Baseline HVF 24-2 8/2023 -- VFI 20/9  Baseline HVF 10-2 12/2023 -- MD -15.62/-23.55  Baseline RNFL 8/2023 -- avg 43/50  Baseline photos 9/2023    Last imaging:  HVF 24-2: reliable, SAD/IAD, central island, VFI 20 OD  reliable, SAD/IAD, central island, VFI 9 OS -- baseline  HVF 10-2: reliable, SAD, MD -12.27, PSD 12.35 OD  reliable, SAD>IAD, MD -20.83, PSD 12.45 -- stable c/w prior  OCT: diffuse thinning, avg 45 OD  diffuse thinning, avg 50 OS -- likely stable c/w prior but at floor OU    05/23/2024  IOP 14/14 on regimen below  Much improved c/w prior    Cataracts OU  - Mild, NVS, monitor    Plan:  IOP likely adequate for now. CPM. Hesitant for SLT in the past.  - Continue Cosopt 2/2, Xalatan 1/1, Brim 2/2    Today's visit is associated with current and anticipated ongoing medical care related to this patient's single serious/complex condition (glaucoma). Follow up is to be continued indefinitely to monitor the condition.    Return 3 months -- HVF 10-2 OU, VA, IOP, Dilate  Next DFE due 9/2024    Griselda Silvestre MD  [facility], Glaucoma

## 2024-05-24 ENCOUNTER — TELEPHONE (OUTPATIENT)
Dept: PODIATRY | Facility: CLINIC | Age: 59
End: 2024-05-24
Payer: MEDICAID

## 2024-05-24 NOTE — TELEPHONE ENCOUNTER
Rescheduled patient from 6/6/24 to 8/7/24 at 10:00 am due to provider on vacation. patient was also added to the wait list. Verbalized understanding and no further issues discussed.

## 2024-07-09 ENCOUNTER — PATIENT MESSAGE (OUTPATIENT)
Dept: ADMINISTRATIVE | Facility: HOSPITAL | Age: 59
End: 2024-07-09
Payer: MEDICAID

## 2024-08-01 ENCOUNTER — TELEPHONE (OUTPATIENT)
Dept: PRIMARY CARE CLINIC | Facility: CLINIC | Age: 59
End: 2024-08-01
Payer: MEDICAID

## 2024-08-01 DIAGNOSIS — Z12.11 COLON CANCER SCREENING: Primary | ICD-10-CM

## 2024-08-01 NOTE — TELEPHONE ENCOUNTER
----- Message from Mony Short sent at 8/1/2024 11:02 AM CDT -----  Contact: shasha @ [phone]  Gatito Dao calling regarding Orders (message) for #pt is calling to get orders put in for conoloscopy, asking for call back

## 2024-08-07 ENCOUNTER — OFFICE VISIT (OUTPATIENT)
Dept: PODIATRY | Facility: CLINIC | Age: 59
End: 2024-08-07
Payer: MEDICAID

## 2024-08-07 VITALS — DIASTOLIC BLOOD PRESSURE: 85 MMHG | HEART RATE: 50 BPM | WEIGHT: 161.19 LBS | SYSTOLIC BLOOD PRESSURE: 131 MMHG

## 2024-08-07 DIAGNOSIS — B35.3 TINEA PEDIS OF BOTH FEET: ICD-10-CM

## 2024-08-07 DIAGNOSIS — F19.90 SUBSTANCE USE DISORDER: ICD-10-CM

## 2024-08-07 DIAGNOSIS — B35.1 ONYCHOMYCOSIS OF TOENAIL: ICD-10-CM

## 2024-08-07 DIAGNOSIS — L60.8 DISCOLORATION AND THICKENING OF NAILS BOTH FEET: Primary | ICD-10-CM

## 2024-08-07 PROCEDURE — 99999 PR PBB SHADOW E&M-EST. PATIENT-LVL III: CPT | Mod: PBBFAC,,, | Performed by: PODIATRIST

## 2024-08-07 PROCEDURE — 99213 OFFICE O/P EST LOW 20 MIN: CPT | Mod: PBBFAC,PN | Performed by: PODIATRIST

## 2024-08-07 PROCEDURE — 3079F DIAST BP 80-89 MM HG: CPT | Mod: CPTII,,, | Performed by: PODIATRIST

## 2024-08-07 PROCEDURE — 3075F SYST BP GE 130 - 139MM HG: CPT | Mod: CPTII,,, | Performed by: PODIATRIST

## 2024-08-07 PROCEDURE — 99203 OFFICE O/P NEW LOW 30 MIN: CPT | Mod: S$PBB,,, | Performed by: PODIATRIST

## 2024-08-07 PROCEDURE — 3044F HG A1C LEVEL LT 7.0%: CPT | Mod: CPTII,,, | Performed by: PODIATRIST

## 2024-08-07 NOTE — PROGRESS NOTES
Subjective:      Patient ID: Gatito Dao is a 59 y.o. male.    Chief Complaint: Tinea Pedis and Nail Problem    Gatito is a 59 y.o. male who presents new to the clinic accompanied by sister, who 'takes care of him', here to ask questions. He c/o of thick & discolored toenails on both feet & white between toes. States tried everything including alcohol, toothpaste, vinegar water, powder & foot cream. No pain or itching. Noticed changes for 10 yrs. Gatito is inquiring about tx options. 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    PCP Cleopatra Faria MD 3/11/24    Past Medical History:   Diagnosis Date    Cataract     Glaucoma     Hypertension      Patient Active Problem List   Diagnosis    Primary open angle glaucoma (POAG) of both eyes, severe stage    Combined forms of age-related cataract of both eyes    Heroin addiction    Primary hypertension      Objective:      Review of Systems   Constitutional: Negative for malaise/fatigue.   Skin:  Positive for color change, dry skin, itching, nail changes and rash.   Musculoskeletal:  Negative for falls, joint pain and myalgias.   Neurological:  Negative for focal weakness, sensory change and weakness.   Psychiatric/Behavioral:  Positive for substance abuse. The patient is not nervous/anxious.      Physical Exam  Vitals reviewed.   Constitutional:       General: He is not in acute distress.     Appearance: He is well-developed.   Cardiovascular:      Pulses:           Dorsalis pedis pulses are 1+ on the right side and 1+ on the left side.   Musculoskeletal:         General: No swelling or tenderness.   Feet:      Right foot:      Skin integrity: Dry skin present. No fissure.      Toenail Condition: Fungal disease present.     Left foot:      Skin integrity: Dry skin present. No fissure.      Toenail Condition: Fungal disease present.  Skin:     Capillary Refill: Capillary refill takes 2 to 3 seconds.      Findings: Rash present. No bruising, erythema or lesion.       Comments: R>L 2nd - 4th IMS maceration & peeling.    Onychomycosis total B/L hallux, medial 4th & distal 1/2 5th. Remaining nails w/out mycotic involvement.   Neurological:      Mental Status: He is alert and oriented to person, place, and time.      Sensory: Sensation is intact. No sensory deficit.      Motor: Motor function is intact. No weakness or abnormal muscle tone.      Gait: Gait is intact. Gait normal.   Psychiatric:         Mood and Affect: Mood and affect normal.         Behavior: Behavior normal. Behavior is cooperative.         Assessment:      Encounter Diagnoses   Name Primary?    Tinea pedis of both feet     Discoloration and thickening of nails both feet Yes    Onychomycosis of toenail     Substance use disorder        Problem List Items Addressed This Visit    None  Visit Diagnoses       Discoloration and thickening of nails both feet    -  Primary    Tinea pedis of both feet        Onychomycosis of toenail        Substance use disorder              Plan:       Gatito was seen today for tinea pedis and nail problem.    Diagnoses and all orders for this visit:    Discoloration and thickening of nails both feet    Tinea pedis of both feet  -     Ambulatory referral/consult to Podiatry    Onychomycosis of toenail    Substance use disorder    I counseled the patient on his conditions, their implications & medical mgmt.    - Shoe inspection. Patient instructed on proper foot hygeine. We discussed wearing proper shoe gear, daily foot inspections, never walking w/out protective shoe gear.    Instructions provided on OTC topical antifungal tx options for nails & skin.    F/u prn.        A total of 29 mins.was spent on chart review, patient visit & documentation.

## 2024-08-08 ENCOUNTER — TELEPHONE (OUTPATIENT)
Dept: ENDOSCOPY | Facility: HOSPITAL | Age: 59
End: 2024-08-08
Payer: MEDICAID

## 2024-08-08 VITALS — WEIGHT: 161 LBS

## 2024-08-08 DIAGNOSIS — Z12.11 ENCOUNTER FOR SCREENING COLONOSCOPY: Primary | ICD-10-CM

## 2024-08-08 DIAGNOSIS — Z12.11 COLON CANCER SCREENING: Primary | ICD-10-CM

## 2024-08-22 ENCOUNTER — CLINICAL SUPPORT (OUTPATIENT)
Dept: OPHTHALMOLOGY | Facility: CLINIC | Age: 59
End: 2024-08-22
Payer: MEDICAID

## 2024-08-22 ENCOUNTER — OFFICE VISIT (OUTPATIENT)
Dept: OPHTHALMOLOGY | Facility: CLINIC | Age: 59
End: 2024-08-22
Payer: MEDICAID

## 2024-08-22 DIAGNOSIS — H40.1133 PRIMARY OPEN ANGLE GLAUCOMA (POAG) OF BOTH EYES, SEVERE STAGE: Primary | ICD-10-CM

## 2024-08-22 DIAGNOSIS — H25.813 COMBINED FORMS OF AGE-RELATED CATARACT OF BOTH EYES: ICD-10-CM

## 2024-08-22 PROCEDURE — 99999 PR PBB SHADOW E&M-EST. PATIENT-LVL II: CPT | Mod: PBBFAC,,, | Performed by: STUDENT IN AN ORGANIZED HEALTH CARE EDUCATION/TRAINING PROGRAM

## 2024-08-22 PROCEDURE — 92083 EXTENDED VISUAL FIELD XM: CPT | Mod: PBBFAC | Performed by: STUDENT IN AN ORGANIZED HEALTH CARE EDUCATION/TRAINING PROGRAM

## 2024-08-22 PROCEDURE — 3044F HG A1C LEVEL LT 7.0%: CPT | Mod: CPTII,,, | Performed by: STUDENT IN AN ORGANIZED HEALTH CARE EDUCATION/TRAINING PROGRAM

## 2024-08-22 PROCEDURE — 99212 OFFICE O/P EST SF 10 MIN: CPT | Mod: PBBFAC | Performed by: STUDENT IN AN ORGANIZED HEALTH CARE EDUCATION/TRAINING PROGRAM

## 2024-08-22 PROCEDURE — G2211 COMPLEX E/M VISIT ADD ON: HCPCS | Mod: S$PBB,,, | Performed by: STUDENT IN AN ORGANIZED HEALTH CARE EDUCATION/TRAINING PROGRAM

## 2024-08-22 PROCEDURE — 99214 OFFICE O/P EST MOD 30 MIN: CPT | Mod: S$PBB,,, | Performed by: STUDENT IN AN ORGANIZED HEALTH CARE EDUCATION/TRAINING PROGRAM

## 2024-08-22 PROCEDURE — 1159F MED LIST DOCD IN RCRD: CPT | Mod: CPTII,,, | Performed by: STUDENT IN AN ORGANIZED HEALTH CARE EDUCATION/TRAINING PROGRAM

## 2024-08-22 NOTE — PROGRESS NOTES
Assessment:  Juvenile open angle glaucoma, severe stage, OD<OS  - Synopsis: Referred by Dr. Alisha Ramirez for elevated IOP OU.   - H/o blunt eye trauma: no black eye but hit with hand during basketball game  - Surg hx: none   - Laser hx: none  - Glaucoma FHx: denies  -  / 556  - Gonio:  (Coulon 8/2023)  - Tmax: 32/34  - Target IOP: <12 OU (severity)  - Med adverse effects: n/a  - RAPD OS on presentation 8/2023  - Med hx: Cosopt/Xalatan 8/2023    Baseline HVF 24-2 8/2023 -- VFI 20/9  Baseline HVF 10-2 12/2023 -- MD -15.62/-23.55  Baseline RNFL 8/2023 -- avg 43/50  Baseline photos 9/2023    Last imaging:  HVF 24-2: reliable, SAD/IAD, central island, VFI 20 OD  reliable, SAD/IAD, central island, VFI 9 OS -- baseline  HVF 10-2: reliable, SAD, MD -12.27, PSD 12.35 OD  reliable, SAD>IAD, MD -20.83, PSD 12.45 -- stable c/w prior  OCT: diffuse thinning, avg 45 OD  diffuse thinning, avg 50 OS -- likely stable c/w prior but at floor OU    08/22/2024  IOP 13/15 pre dilation, 16/18 post dilation  HVF 10-2: reliable, SAD, MD -10.04, PSD 9.51 OD  reliable, SAD>IAD, MD -19.66, PSD 12.24 -- stable c/w prior    Cataracts OU  - Mild, NVS, monitor    Plan:  IOP likely adequate for now. CPM. Hesitant for SLT in the past.   - Continue Cosopt 2/2, Xalatan 1/1, Brim 2/2    Today's visit is associated with current and anticipated ongoing medical care related to this patient's single serious/complex condition (glaucoma). Follow up is to be continued indefinitely to monitor the condition.    Return 4 months -- HVF 10-2 OU, VA, IOP  Next DFE due 8/2025    Griselda Silvestre MD  [facility], Glaucoma

## 2024-10-21 ENCOUNTER — ANESTHESIA EVENT (OUTPATIENT)
Dept: ENDOSCOPY | Facility: HOSPITAL | Age: 59
End: 2024-10-21
Payer: MEDICAID

## 2024-10-23 ENCOUNTER — TELEPHONE (OUTPATIENT)
Dept: ENDOSCOPY | Facility: HOSPITAL | Age: 59
End: 2024-10-23
Payer: MEDICAID

## 2024-10-23 NOTE — TELEPHONE ENCOUNTER
Spoke to patient for pre-call to confirm scheduled Colonoscopy and patient verbalized understanding of the following:       Date & arrival time of procedure(s) verified 10/30/24, 10:00 AM.  Location of procedure(s) [facility] 2nd Floor verified.  NPO status reinforced. Ok to continue clear liquids until 9:00 AM.   Patient denies use of blood thinners, GLP-1 medications, and weight loss medications.  Patient confirmed receipt of prep instructions and Rx prep.  Instructions provided to patient via [facility].  Patient confirmed ride home after procedure if procedure requires anesthesia.   Pre-call screening questionnaire reviewed and completed with patient.   Appointment details are tentative, including check-in time.  If the patient begins taking any blood thinning medications, injectable weight loss/diabetes medications (other than insulin), or Adipex (phentermine) patient was instructed to contact the endoscopy scheduling department as soon as possible.  Patient was advised to call the endoscopy scheduling department if any questions or concerns arise.     [facility] Scheduling Department

## 2024-10-30 ENCOUNTER — ANESTHESIA (OUTPATIENT)
Dept: ENDOSCOPY | Facility: HOSPITAL | Age: 59
End: 2024-10-30
Payer: MEDICAID

## 2024-10-30 ENCOUNTER — HOSPITAL ENCOUNTER (OUTPATIENT)
Facility: HOSPITAL | Age: 59
Discharge: HOME | End: 2024-10-30
Attending: INTERNAL MEDICINE | Admitting: INTERNAL MEDICINE
Payer: MEDICAID

## 2024-10-30 VITALS
RESPIRATION RATE: 18 BRPM | TEMPERATURE: 97 F | BODY MASS INDEX: 24.5 KG/M2 | WEIGHT: 175 LBS | DIASTOLIC BLOOD PRESSURE: 85 MMHG | OXYGEN SATURATION: 100 % | SYSTOLIC BLOOD PRESSURE: 123 MMHG | HEIGHT: 71 IN | HEART RATE: 50 BPM

## 2024-10-30 DIAGNOSIS — Z12.11 COLON CANCER SCREENING: Primary | ICD-10-CM

## 2024-10-30 DIAGNOSIS — Z12.11 SCREEN FOR COLON CANCER: ICD-10-CM

## 2024-10-30 PROCEDURE — 37000009 HC ANESTHESIA EA ADD 15 MINS: Performed by: INTERNAL MEDICINE

## 2024-10-30 PROCEDURE — 63600175 PHARM REV CODE 636 W HCPCS: Performed by: NURSE ANESTHETIST, CERTIFIED REGISTERED

## 2024-10-30 PROCEDURE — 88305 TISSUE EXAM BY PATHOLOGIST: CPT | Performed by: PATHOLOGY

## 2024-10-30 PROCEDURE — 45385 COLONOSCOPY W/LESION REMOVAL: CPT | Performed by: INTERNAL MEDICINE

## 2024-10-30 PROCEDURE — 94761 N-INVAS EAR/PLS OXIMETRY MLT: CPT

## 2024-10-30 PROCEDURE — 37000008 HC ANESTHESIA 1ST 15 MINUTES: Performed by: INTERNAL MEDICINE

## 2024-10-30 PROCEDURE — 27201089 HC SNARE, DISP (ANY): Performed by: INTERNAL MEDICINE

## 2024-10-30 PROCEDURE — 99900035 HC TECH TIME PER 15 MIN (STAT)

## 2024-10-30 PROCEDURE — 25000003 PHARM REV CODE 250: Performed by: NURSE ANESTHETIST, CERTIFIED REGISTERED

## 2024-10-30 PROCEDURE — A4216 STERILE WATER/SALINE, 10 ML: HCPCS | Performed by: NURSE ANESTHETIST, CERTIFIED REGISTERED

## 2024-10-30 PROCEDURE — 45385 COLONOSCOPY W/LESION REMOVAL: CPT | Mod: ,,, | Performed by: INTERNAL MEDICINE

## 2024-10-30 PROCEDURE — 88305 TISSUE EXAM BY PATHOLOGIST: CPT | Mod: 26,,, | Performed by: PATHOLOGY

## 2024-10-30 RX ORDER — PROPOFOL 10 MG/ML
VIAL (ML) INTRAVENOUS
Status: DISCONTINUED | OUTPATIENT
Start: 2024-10-30 | End: 2024-10-30

## 2024-10-30 RX ORDER — PROPOFOL 10 MG/ML
VIAL (ML) INTRAVENOUS CONTINUOUS PRN
Status: DISCONTINUED | OUTPATIENT
Start: 2024-10-30 | End: 2024-10-30

## 2024-10-30 RX ORDER — SODIUM CHLORIDE 9 MG/ML
INJECTION, SOLUTION INTRAVENOUS CONTINUOUS
Status: DISCONTINUED | OUTPATIENT
Start: 2024-10-30 | End: 2024-10-30 | Stop reason: HOSPADM

## 2024-10-30 RX ORDER — SODIUM CHLORIDE 0.9 % (FLUSH) 0.9 %
SYRINGE (ML) INJECTION
Status: DISCONTINUED | OUTPATIENT
Start: 2024-10-30 | End: 2024-10-30

## 2024-10-30 RX ORDER — LIDOCAINE HYDROCHLORIDE 20 MG/ML
INJECTION INTRAVENOUS
Status: DISCONTINUED | OUTPATIENT
Start: 2024-10-30 | End: 2024-10-30

## 2024-10-30 RX ADMIN — PROPOFOL 150 MCG/KG/MIN: 10 INJECTION, EMULSION INTRAVENOUS at 10:10

## 2024-10-30 RX ADMIN — Medication 20 ML: at 10:10

## 2024-10-30 RX ADMIN — Medication 10 ML: at 10:10

## 2024-10-30 RX ADMIN — Medication 30 ML: at 10:10

## 2024-10-30 RX ADMIN — LIDOCAINE HYDROCHLORIDE 8 MG: 20 INJECTION INTRAVENOUS at 10:10

## 2024-10-30 RX ADMIN — PROPOFOL 80 MG: 10 INJECTION, EMULSION INTRAVENOUS at 10:10

## 2024-10-30 NOTE — PLAN OF CARE
Pt arrived to recovery dosc via stretcher per ENDO team. Bedside report received. Pt attached to bedside monitor. VSS. Pt resting without distress post procedure. Pt on room air; oxygen sats 97%. Pt IV access clean, dry and intact, saline locked. Safety maintained.

## 2024-10-30 NOTE — PROVATION PATIENT INSTRUCTIONS
Discharge Summary/Instructions after an Endoscopic Procedure  Patient Name: Gatito Dao  Patient MRN: [id number]  Patient YOB: 1965 Wednesday, October 30, 2024  Benjamín Godinez MD  Dear patient,  As a result of recent federal legislation (The Federal Cures Act), you may   receive lab or pathology results from your procedure in your [facility]   account before your physician is able to contact you. Your physician or   their representative will relay the results to you with their   recommendations at their soonest availability.  Thank you,  RESTRICTIONS:  During your procedure today, you received medications for sedation.  These   medications may affect your judgment, balance and coordination.  Therefore,   for 24 hours, you have the following restrictions:   - DO NOT drive a car, operate machinery, make legal/financial decisions,   sign important papers or drink alcohol.    ACTIVITY:  Today: no heavy lifting, straining or running due to procedural   sedation/anesthesia.  The following day: return to full activity including work.  DIET:  Eat and drink normally unless instructed otherwise.     TREATMENT FOR COMMON SIDE EFFECTS:  - Mild abdominal pain, nausea, belching, bloating or excessive gas:  rest,   eat lightly and use a heating pad.  - Sore Throat: treat with throat lozenges and/or gargle with warm salt   water.  - Because air was used during the procedure, expelling large amounts of air   from your rectum or belching is normal.  - If a bowel prep was taken, you may not have a bowel movement for 1-3 days.    This is normal.  SYMPTOMS TO WATCH FOR AND REPORT TO YOUR PHYSICIAN:  1. Abdominal pain or bloating, other than gas cramps.  2. Chest pain.  3. Back pain.  4. Signs of infection such as: chills or fever occurring within 24 hours   after the procedure.  5. Rectal bleeding, which would show as bright red, maroon, or black stools.   (A tablespoon of blood from the rectum is not serious, especially  if   hemorrhoids are present.)  6. Vomiting.  7. Weakness or dizziness.  GO DIRECTLY TO THE NEAREST EMERGENCY ROOM IF YOU HAVE ANY OF THE FOLLOWING:      Difficulty breathing              Chills and/or fever over 101 F   Persistent vomiting and/or vomiting blood   Severe abdominal pain   Severe chest pain   Black, tarry stools   Bleeding- more than one tablespoon   Any other symptom or condition that you feel may need urgent attention  Your doctor recommends these additional instructions:  If any biopsies were taken, your doctors clinic will contact you in 1 to 2   weeks with any results.  - Patient has a contact number available for emergencies.  The signs and   symptoms of potential delayed complications were discussed with the   patient.  Return to normal activities tomorrow.  Written discharge   instructions were provided to the patient.   - Discharge patient to home.   - Resume previous diet.   - Continue present medications.   - Await pathology results.   - Repeat colonoscopy in 5 years for surveillance.   For questions, problems or results please call your physician - Benjamín Godinez MD at Work:  [phone].  [facility], EMERGENCY ROOM PHONE NUMBER: [phone]  IF A COMPLICATION OR EMERGENCY SITUATION ARISES AND YOU ARE UNABLE TO REACH   YOUR PHYSICIAN - GO DIRECTLY TO THE EMERGENCY ROOM.  Benjamín Godinez MD  10/30/2024 11:04:31 AM  This report has been verified and signed electronically.  Dear patient,  As a result of recent federal legislation (The Federal Cures Act), you may   receive lab or pathology results from your procedure in your [facility]   account before your physician is able to contact you. Your physician or   their representative will relay the results to you with their   recommendations at their soonest availability.  Thank you,  PROVATION

## 2024-10-30 NOTE — H&P
Short Stay Endoscopy History and Physical    PCP - Cleopatra Faria MD    Procedure - Colonoscopy  Sedation: GA  ASA - per anesthesia  Mallampati - per anesthesia  History of Anesthesia problems - no  Family history Anesthesia problems -  no     HPI:  This is a 59 y.o. male here for evaluation of : Screening for CRC    Reflux - no  Dysphagia - no  Abdominal pain - no  Diarrhea - no    ROS:  Constitutional: No fevers, chills, No weight loss  ENT: No allergies  CV: No chest pain  Pulm: No cough, No shortness of breath  Ophtho: No vision changes  GI: see HPI  Medical History:  has a past medical history of Cataract, Glaucoma, and Hypertension.    Surgical History:  has no past surgical history on file.    Family History: family history is not on file.. Otherwise no colon cancer, inflammatory bowel disease, or GI malignancies.    Social History:  reports that he has never smoked. He has never used smokeless tobacco.    Review of patient's allergies indicates:  No Known Allergies    Medications:   Medications Prior to Admission   Medication Sig Dispense Refill Last Dose/Taking    brimonidine 0.2% (ALPHAGAN) 0.2 % Drop Place 1 drop into both eyes 2 (two) times a day. 15 mL 11     dorzolamide-timolol 2-0.5% (COSOPT) 22.3-6.8 mg/mL ophthalmic solution Place 1 drop into both eyes 2 (two) times daily. 10 mL 11     latanoprost (XALATAN) 0.005 % ophthalmic solution Place 1 drop into both eyes once daily. 2.5 mL 11     latanoprost (XALATAN) 0.005 % ophthalmic solution Place 1 drop into both eyes once daily. 2.5 mL 11     losartan (COZAAR) 50 MG tablet Take 1 tablet by mouth once daily. (Patient not taking: Reported on 8/7/2024)          Objective Findings:    Vital Signs: Per nursing notes.    Physical Exam:  General Appearance: Well appearing in no acute distress  Head:   Normocephalic, without obvious abnormality  Eyes:    No scleral icterus  Airway: Open  Neck: No restriction in mobility  Lungs: CTA bilaterally in anterior and  posterior fields, no wheezes, no crackles.  Heart:  Regular rate and rhythm, S1, S2 normal, no murmurs heard  Abdomen: Soft, non tender, non distended      Labs:  Lab Results   Component Value Date    WBC 5.35 03/14/2024    HGB 13.5 (L) 03/14/2024    HCT 38.7 (L) 03/14/2024     03/14/2024    CHOL 195 03/14/2024    TRIG 50 03/14/2024    HDL 69 03/14/2024    ALT 25 03/14/2024    AST 24 03/14/2024     03/14/2024    K 5.2 (H) 03/14/2024     03/14/2024    CREATININE 1.1 03/14/2024    BUN 13 03/14/2024    CO2 23 03/14/2024    PSA 0.45 03/14/2024    HGBA1C 5.4 03/14/2024         I have explained the risks and benefits of endoscopy procedures to the patient including but not limited to bleeding, perforation, infection, and death.    Thank you so much for allowing me to participate in the care of Gatito Godinez MD

## 2024-11-01 LAB
FINAL PATHOLOGIC DIAGNOSIS: NORMAL
GROSS: NORMAL
Lab: NORMAL

## 2024-11-04 ENCOUNTER — TELEPHONE (OUTPATIENT)
Dept: GASTROENTEROLOGY | Facility: CLINIC | Age: 59
End: 2024-11-04
Payer: MEDICAID

## 2024-11-04 NOTE — TELEPHONE ENCOUNTER
----- Message from Benjamín Godinez MD sent at 11/4/2024  3:59 PM CST -----  Please call and notify patient, the colon polyp was benign.

## 2024-12-09 RX ORDER — LATANOPROST 50 UG/ML
1 SOLUTION/ DROPS OPHTHALMIC DAILY
Qty: 2.5 ML | Refills: 11 | Status: CANCELLED | OUTPATIENT
Start: 2024-12-09 | End: 2025-12-09

## 2024-12-09 RX ORDER — DORZOLAMIDE HYDROCHLORIDE AND TIMOLOL MALEATE 20; 5 MG/ML; MG/ML
1 SOLUTION/ DROPS OPHTHALMIC 2 TIMES DAILY
Qty: 10 ML | Refills: 11 | Status: CANCELLED | OUTPATIENT
Start: 2024-12-09 | End: 2025-12-09

## 2024-12-10 RX ORDER — DORZOLAMIDE HYDROCHLORIDE AND TIMOLOL MALEATE 20; 5 MG/ML; MG/ML
1 SOLUTION/ DROPS OPHTHALMIC 2 TIMES DAILY
Qty: 10 ML | Refills: 11 | Status: SHIPPED | OUTPATIENT
Start: 2024-12-10 | End: 2024-12-12 | Stop reason: SDUPTHER

## 2024-12-10 RX ORDER — LATANOPROST 50 UG/ML
1 SOLUTION/ DROPS OPHTHALMIC DAILY
Qty: 2.5 ML | Refills: 11 | Status: SHIPPED | OUTPATIENT
Start: 2024-12-10 | End: 2025-12-10

## 2024-12-12 ENCOUNTER — OFFICE VISIT (OUTPATIENT)
Dept: OPHTHALMOLOGY | Facility: CLINIC | Age: 59
End: 2024-12-12
Payer: MEDICAID

## 2024-12-12 ENCOUNTER — CLINICAL SUPPORT (OUTPATIENT)
Dept: OPHTHALMOLOGY | Facility: CLINIC | Age: 59
End: 2024-12-12
Payer: MEDICAID

## 2024-12-12 DIAGNOSIS — H25.813 COMBINED FORMS OF AGE-RELATED CATARACT OF BOTH EYES: ICD-10-CM

## 2024-12-12 DIAGNOSIS — H40.1133 PRIMARY OPEN ANGLE GLAUCOMA (POAG) OF BOTH EYES, SEVERE STAGE: Primary | ICD-10-CM

## 2024-12-12 PROCEDURE — 99999 PR PBB SHADOW E&M-EST. PATIENT-LVL I: CPT | Mod: PBBFAC,,, | Performed by: STUDENT IN AN ORGANIZED HEALTH CARE EDUCATION/TRAINING PROGRAM

## 2024-12-12 PROCEDURE — G2211 COMPLEX E/M VISIT ADD ON: HCPCS | Mod: S$PBB,,, | Performed by: STUDENT IN AN ORGANIZED HEALTH CARE EDUCATION/TRAINING PROGRAM

## 2024-12-12 PROCEDURE — 1159F MED LIST DOCD IN RCRD: CPT | Mod: CPTII,,, | Performed by: STUDENT IN AN ORGANIZED HEALTH CARE EDUCATION/TRAINING PROGRAM

## 2024-12-12 PROCEDURE — 92083 EXTENDED VISUAL FIELD XM: CPT | Mod: PBBFAC | Performed by: STUDENT IN AN ORGANIZED HEALTH CARE EDUCATION/TRAINING PROGRAM

## 2024-12-12 PROCEDURE — 99211 OFF/OP EST MAY X REQ PHY/QHP: CPT | Mod: PBBFAC | Performed by: STUDENT IN AN ORGANIZED HEALTH CARE EDUCATION/TRAINING PROGRAM

## 2024-12-12 PROCEDURE — 99214 OFFICE O/P EST MOD 30 MIN: CPT | Mod: S$PBB,,, | Performed by: STUDENT IN AN ORGANIZED HEALTH CARE EDUCATION/TRAINING PROGRAM

## 2024-12-12 PROCEDURE — 3044F HG A1C LEVEL LT 7.0%: CPT | Mod: CPTII,,, | Performed by: STUDENT IN AN ORGANIZED HEALTH CARE EDUCATION/TRAINING PROGRAM

## 2024-12-12 RX ORDER — BRIMONIDINE TARTRATE 2 MG/ML
1 SOLUTION/ DROPS OPHTHALMIC 2 TIMES DAILY
Qty: 30 ML | Refills: 4 | Status: SHIPPED | OUTPATIENT
Start: 2024-12-12 | End: 2025-12-12

## 2024-12-12 RX ORDER — LATANOPROST 50 UG/ML
1 SOLUTION/ DROPS OPHTHALMIC DAILY
Qty: 7.5 ML | Refills: 4 | Status: SHIPPED | OUTPATIENT
Start: 2024-12-12 | End: 2025-12-12

## 2024-12-12 RX ORDER — DORZOLAMIDE HYDROCHLORIDE AND TIMOLOL MALEATE 20; 5 MG/ML; MG/ML
1 SOLUTION/ DROPS OPHTHALMIC 2 TIMES DAILY
Qty: 30 ML | Refills: 4 | Status: SHIPPED | OUTPATIENT
Start: 2024-12-12 | End: 2025-12-12

## 2024-12-12 NOTE — PROGRESS NOTES
HPI    DLS: 8/22/24 w/ Dr. Silvestre     59 y.o. male presents for IOP Check w/ HVF. Patient complains of cloudy VA   OU. Denies eye pain, headaches, flashes, and floaters.    Cosopt BID OU   Brimonidine BID OU   Latanoprost Q HS OU        Last edited by Elida Hays on 12/12/2024  9:05 AM.        Assessment:  Juvenile open angle glaucoma, severe stage, OD<OS  - Synopsis: Referred by Dr. Alisha Ramirez for elevated IOP OU.   - H/o blunt eye trauma: no black eye but hit with hand during basketball game  - Surg hx: none   - Laser hx: none  - Glaucoma FHx: denies  -  / 556  - Gonio:  (Konrad 8/2023)  - Tmax: 32/34  - Target IOP: <12 OU (severity)  - Med adverse effects: n/a  - RAPD OS on presentation 8/2023  - Med hx: Cosopt/Xalatan 8/2023    Baseline HVF 24-2 8/2023 -- VFI 20/9  Baseline HVF 10-2 12/2023 -- MD -15.62/-23.55  Baseline RNFL 8/2023 -- avg 43/50  Baseline photos 9/2023    Last imaging:  HVF 24-2: reliable, SAD/IAD, central island, VFI 20 OD  reliable, SAD/IAD, central island, VFI 9 OS -- baseline  HVF 10-2: reliable, SAD, MD -10.04, PSD 9.51 OD  reliable, SAD>IAD, MD -19.66, PSD 12.24 -- stable c/w prior  OCT: diffuse thinning, avg 45 OD  diffuse thinning, avg 50 OS -- likely stable c/w prior but at floor OU    12/12/2024  IOP 16/17 on regimen below  HVF 10-2: reliable, SAD, MD -12.14, PSD 12.37 OD  reliable, SAD>IAD, MD -21.66, PSD 12.24 -- worse c/w 8/2024 but stable c/w 4/2024 OU    Cataracts OU  - Mild, NVS, monitor    Plan:  IOP creeping up last few visits. HVF stable so will CPM, but encouraging SLT to push lower.  - Continue Cosopt 2/2, Xalatan 1/1, Brim 2/2  - SLT info given- if IOP above 15, will consider again at next visit    Today's visit is associated with current and anticipated ongoing medical care related to this patient's single serious/complex condition (glaucoma). Follow up is to be continued indefinitely to monitor the condition.    Return 3 months -- HVF 10-2 OU,  VA, IOP  Next DFE due 8/2025    Griselda Silvestre MD  [facility], Glaucoma

## 2024-12-12 NOTE — PROGRESS NOTES
HVF rel/fix coop good OU/chart checked for latex allergy/0 + 0.25 x 79 OD 0.50 + 1.75 x 1 OS -BJ

## 2025-01-31 ENCOUNTER — TELEPHONE (OUTPATIENT)
Dept: ENDOSCOPY | Facility: HOSPITAL | Age: 60
End: 2025-01-31
Payer: MEDICAID

## 2025-03-26 DIAGNOSIS — I10 PRIMARY HYPERTENSION: ICD-10-CM

## 2025-04-09 ENCOUNTER — TELEPHONE (OUTPATIENT)
Dept: OPTOMETRY | Facility: CLINIC | Age: 60
End: 2025-04-09
Payer: MEDICAID

## 2025-04-09 ENCOUNTER — OFFICE VISIT (OUTPATIENT)
Dept: OPHTHALMOLOGY | Facility: CLINIC | Age: 60
End: 2025-04-09
Payer: MEDICAID

## 2025-04-09 ENCOUNTER — CLINICAL SUPPORT (OUTPATIENT)
Dept: OPHTHALMOLOGY | Facility: CLINIC | Age: 60
End: 2025-04-09
Payer: MEDICAID

## 2025-04-09 DIAGNOSIS — H25.813 COMBINED FORMS OF AGE-RELATED CATARACT OF BOTH EYES: ICD-10-CM

## 2025-04-09 DIAGNOSIS — H40.1133 PRIMARY OPEN ANGLE GLAUCOMA (POAG) OF BOTH EYES, SEVERE STAGE: Primary | ICD-10-CM

## 2025-04-09 PROCEDURE — 92083 EXTENDED VISUAL FIELD XM: CPT | Mod: PBBFAC | Performed by: STUDENT IN AN ORGANIZED HEALTH CARE EDUCATION/TRAINING PROGRAM

## 2025-04-09 PROCEDURE — 1159F MED LIST DOCD IN RCRD: CPT | Mod: CPTII,,, | Performed by: STUDENT IN AN ORGANIZED HEALTH CARE EDUCATION/TRAINING PROGRAM

## 2025-04-09 PROCEDURE — 99214 OFFICE O/P EST MOD 30 MIN: CPT | Mod: S$PBB,,, | Performed by: STUDENT IN AN ORGANIZED HEALTH CARE EDUCATION/TRAINING PROGRAM

## 2025-04-09 PROCEDURE — 99211 OFF/OP EST MAY X REQ PHY/QHP: CPT | Mod: PBBFAC | Performed by: STUDENT IN AN ORGANIZED HEALTH CARE EDUCATION/TRAINING PROGRAM

## 2025-04-09 PROCEDURE — 99999 PR PBB SHADOW E&M-EST. PATIENT-LVL I: CPT | Mod: PBBFAC,,, | Performed by: STUDENT IN AN ORGANIZED HEALTH CARE EDUCATION/TRAINING PROGRAM

## 2025-04-09 PROCEDURE — G2211 COMPLEX E/M VISIT ADD ON: HCPCS | Mod: S$PBB,,, | Performed by: STUDENT IN AN ORGANIZED HEALTH CARE EDUCATION/TRAINING PROGRAM

## 2025-04-09 NOTE — PROGRESS NOTES
hvf ou done/ou/rel/fix/coop.good/patient chart checked for allergies/od.plano +0.25 x79/os.+0.50 +1.75 x1.ej        Assessment /Plan     For exam results, see Encounter Report.    There are no diagnoses linked to this encounter.

## 2025-04-09 NOTE — PROGRESS NOTES
HPI    DLS: 12/22/24 w/ Dr. Silvestre     60 y.o. male presents for IOP Check w/ HVF. Patient sts VA stable. No new   ocular complaints.     Cosopt BID OU   Brimonidine BID OU   Latanoprost Q HS OU        Last edited by Luisa Villeda on 4/9/2025 10:03 AM.        Assessment:  Juvenile open angle glaucoma, severe stage, OD<OS  - Synopsis: Referred by Dr. Alisha Ramirez for elevated IOP OU.   - H/o blunt eye trauma: no black eye but hit with hand during basketball game  - Surg hx: none   - Laser hx: none  - Glaucoma FHx: denies  -  / 556  - Gonio:  (Konrad 8/2023)  - Tmax: 32/34  - Target IOP: <12 OU (severity)  - Med adverse effects: n/a  - RAPD OS on presentation 8/2023  - Med hx: Cosopt/Xalatan 8/2023    Baseline HVF 24-2 8/2023 -- VFI 20/9  Baseline HVF 10-2 12/2023 -- MD -15.62/-23.55  Baseline RNFL 8/2023 -- avg 43/50  Baseline photos 9/2023    Last imaging:  HVF 24-2: reliable, SAD/IAD, central island, VFI 20 OD  reliable, SAD/IAD, central island, VFI 9 OS -- baseline  HVF 10-2: reliable, SAD, MD -12.14, PSD 12.37 OD  reliable, SAD>IAD, MD -21.66, PSD 12.24 -- worse c/w 8/2024 but stable c/w 4/2024 OU  OCT: diffuse thinning, avg 45 OD  diffuse thinning, avg 50 OS -- likely stable c/w prior but at floor OU    04/09/2025  IOP 12/12 on regimen below  HVF 10-2: reliable, SAD, early IAD, MD -10.38, PSD 11.49 OD  reliable, SAD>IAD, MD -19.0, PSD 12.09 -- worse c/w 8/2024 but stable c/w 4/2024 OU    Cataracts OU  - Mild, NVS, monitor    Plan:  IOP creeping up last few visits. HVF stable so will CPM, but encouraging SLT to push lower.    Today: IOP okay. HVF stable. Amenable to SLT in future PRN.  - Continue Cosopt 2/2, Xalatan 1/1, Brim 2/2  - SLT info given and is amenable as needed    Today's visit is associated with current and anticipated ongoing medical care related to this patient's single serious/complex condition (glaucoma). Follow up is to be continued indefinitely to monitor the  condition.    Optometry MRX  3 months KL or JDN -- IOP  Return me 6 months -- HVF 10-2, VA, IOP, Dilate  Next DFE due 8/2025    Griselda Silvestre MD  [facility], Glaucoma

## 2025-05-01 ENCOUNTER — TELEPHONE (OUTPATIENT)
Dept: OPHTHALMOLOGY | Facility: CLINIC | Age: 60
End: 2025-05-01
Payer: MEDICAID

## 2025-05-01 NOTE — TELEPHONE ENCOUNTER
----- Message from Rosy Wade sent at 4/29/2025 10:20 AM CDT -----  Please reschedule pt for another appt.  ----- Message -----  From: Rios Dao  Sent: 4/29/2025  10:02 AM CDT  To: Rosy Wade OD    Type:  Needs Medical AdviceWho Called: PatientSymptoms (please be specific): na How long has patient had these symptoms:  naPharmacy name and phone #:  naWould the patient rather a call back or a response via [facility]? Call [facility] Call Back Number: [phone] Information: Pt is requesting a call back asp regarding rescheduling his canceled appt

## 2025-07-12 NOTE — PROGRESS NOTES
HPI     Glaucoma            Comments: 3 month IOP ck and pt states no changes or complaints since   last exam           Comments    DLS: 4/9/25 ()    1. Severe Juvenile OAG OD<OS  2. NS OU    MEDS:  Cosopt BID OU  Brimonidine BID OU  Latanoprost QHS OU          Last edited by Val Greene MA on 7/15/2025  9:17 AM.        DAVE PT WITH SEVER JUVENILE ONSET GLAUCOMA   SEE HER LAST NOTE BELOW   HERE FOR IOP CHECK       HPI     Glaucoma            Comments: 3 month IOP ck and pt states no changes or complaints since   last exam           Comments    DLS: 4/9/25 ()    1. Severe Juvenile OAG OD<OS  2. NS OU    MEDS:  Cosopt BID OU  Brimonidine BID OU  Latanoprost QHS OU          Last edited by Val Greene MA on 7/15/2025  9:17 AM.        Assessment:  Juvenile open angle glaucoma, severe stage, OD<OS  - Synopsis: Referred by Dr. Alisha Ramirez for elevated IOP OU.   - H/o blunt eye trauma: no black eye but hit with hand during basketball game  - Surg hx: none   - Laser hx: none  - Glaucoma FHx: denies  -  / 556  - Gonio:  (Dave 8/2023)  - Tmax: 32/34  - Target IOP: <12 OU (severity)  - Med adverse effects: n/a  - RAPD OS on presentation 8/2023  - Med hx: Cosopt/Xalatan 8/2023    Baseline HVF 24-2 8/2023 -- VFI 20/9  Baseline HVF 10-2 12/2023 -- MD -15.62/-23.55  Baseline RNFL 8/2023 -- avg 43/50  Baseline photos 9/2023    Last imaging:  HVF 24-2: reliable, SAD/IAD, central island, VFI 20 OD  reliable, SAD/IAD, central island, VFI 9 OS -- baseline  HVF 10-2: reliable, SAD, MD -12.14, PSD 12.37 OD  reliable, SAD>IAD, MD -21.66, PSD 12.24 -- worse c/w 8/2024 but stable c/w 4/2024 OU  OCT: diffuse thinning, avg 45 OD  diffuse thinning, avg 50 OS -- likely stable c/w prior but at floor OU    07/15/2025  IOP 12/12 on regimen below  HVF 10-2: reliable, SAD, early IAD, MD -10.38, PSD 11.49 OD  reliable, SAD>IAD, MD -19.0, PSD 12.09 -- worse c/w 8/2024 but stable c/w 4/2024  OU    Cataracts OU  - Mild, NVS, monitor    Plan:  IOP creeping up last few visits. HVF stable so will CPM, but encouraging SLT to push lower.    Today: IOP okay. HVF stable. Amenable to SLT in future PRN.  - Continue Cosopt 2/2, Xalatan 1/1, Brim 2/2  - SLT info given and is amenable as needed    Today's visit is associated with current and anticipated ongoing medical care related to this patient's single serious/complex condition (glaucoma). Follow up is to be continued indefinitely to monitor the condition.    Optometry MRX  3 months KL or JDN -- IOP  Return me 6 months -- HVF 10-2, VA, IOP, Dilate  Next DFE due 8/2025    7/15/2025 - Courtney  IOP close to targert - just above os  Target 12 ou   Today 12 od and 13 os   CSM    F/u coulon - appt made    Sheri Valdez MD  [facility], Gl[facility]

## 2025-07-15 ENCOUNTER — OFFICE VISIT (OUTPATIENT)
Dept: OPHTHALMOLOGY | Facility: CLINIC | Age: 60
End: 2025-07-15
Payer: MEDICAID

## 2025-07-15 ENCOUNTER — PATIENT OUTREACH (OUTPATIENT)
Dept: ADMINISTRATIVE | Facility: OTHER | Age: 60
End: 2025-07-15
Payer: MEDICAID

## 2025-07-15 DIAGNOSIS — I10 PRIMARY HYPERTENSION: ICD-10-CM

## 2025-07-15 DIAGNOSIS — H25.813 COMBINED FORMS OF AGE-RELATED CATARACT OF BOTH EYES: ICD-10-CM

## 2025-07-15 DIAGNOSIS — H40.1133 PRIMARY OPEN ANGLE GLAUCOMA (POAG) OF BOTH EYES, SEVERE STAGE: Primary | ICD-10-CM

## 2025-07-15 PROCEDURE — 99999 PR PBB SHADOW E&M-EST. PATIENT-LVL II: CPT | Mod: PBBFAC,,, | Performed by: OPHTHALMOLOGY

## 2025-07-15 PROCEDURE — 99214 OFFICE O/P EST MOD 30 MIN: CPT | Mod: S$PBB,,, | Performed by: OPHTHALMOLOGY

## 2025-07-15 PROCEDURE — 99212 OFFICE O/P EST SF 10 MIN: CPT | Mod: PBBFAC | Performed by: OPHTHALMOLOGY

## 2025-07-15 PROCEDURE — 1159F MED LIST DOCD IN RCRD: CPT | Mod: CPTII,,, | Performed by: OPHTHALMOLOGY

## 2025-07-15 PROCEDURE — 1160F RVW MEDS BY RX/DR IN RCRD: CPT | Mod: CPTII,,, | Performed by: OPHTHALMOLOGY

## 2025-07-22 ENCOUNTER — OFFICE VISIT (OUTPATIENT)
Dept: OPTOMETRY | Facility: CLINIC | Age: 60
End: 2025-07-22
Payer: MEDICAID

## 2025-07-22 DIAGNOSIS — Z53.21 PROCEDURE AND TREATMENT NOT CARRIED OUT DUE TO PATIENT LEAVING PRIOR TO BEING SEEN BY HEALTH CARE PROVIDER: Primary | ICD-10-CM

## 2025-07-22 PROCEDURE — 99999 PR PBB SHADOW E&M-EST. PATIENT-LVL I: CPT | Mod: PBBFAC,,, | Performed by: OPTOMETRIST

## 2025-07-22 PROCEDURE — 99211 OFF/OP EST MAY X REQ PHY/QHP: CPT | Mod: PBBFAC | Performed by: OPTOMETRIST

## 2025-07-22 PROCEDURE — 99499 UNLISTED E&M SERVICE: CPT | Mod: S$PBB,,, | Performed by: OPTOMETRIST

## 2025-07-22 NOTE — PROGRESS NOTES
HPI    Pt is here for Mrx, Pt sts the glasses work fine.   Last edited by Maryann Mckay on 7/22/2025 11:45 AM.            Assessment /Plan     For exam results, see Encounter Report.    Procedure and treatment not carried out due to patient leaving prior to being seen by health care provider      PT NOT SEEN BY DOCTOR, ONLY NEEDED OPTICAL; NOT A NEW RX. PREVIOUSLY HAD A ROUTINE/NEW RX EARLIER THIS MONTH.